# Patient Record
Sex: MALE | NOT HISPANIC OR LATINO | Employment: FULL TIME | URBAN - METROPOLITAN AREA
[De-identification: names, ages, dates, MRNs, and addresses within clinical notes are randomized per-mention and may not be internally consistent; named-entity substitution may affect disease eponyms.]

---

## 2019-02-21 ENCOUNTER — OFFICE VISIT (OUTPATIENT)
Dept: FAMILY MEDICINE | Facility: CLINIC | Age: 54
End: 2019-02-21

## 2019-02-21 DIAGNOSIS — Z02.89 HEALTH EXAMINATION OF DEFINED SUBPOPULATION: Primary | ICD-10-CM

## 2019-02-21 LAB
BILIRUB UR QL: NORMAL
CLARITY: CLEAR
COLOR UR: YELLOW
GLUCOSE URINE: 1000 MG/DL
HGB UR QL: NORMAL
KETONES UR QL: 5 MG/DL
NITRITE UR QL STRIP: NORMAL
PH UR STRIP: 5.5 PH (ref 5–7)
PROT UR QL: NORMAL MG/DL
SP GR UR STRIP: 1.03 (ref 1–1.03)
SPECIMEN VOL UR: NORMAL ML
UROBILINOGEN UR QL STRIP: 0.2 EU/DL (ref 0.2–1)
WBC #/AREA URNS HPF: NORMAL /[HPF]

## 2019-02-21 PROCEDURE — 81003 URINALYSIS AUTO W/O SCOPE: CPT | Performed by: PHYSICIAN ASSISTANT

## 2019-02-21 PROCEDURE — 99499 UNLISTED E&M SERVICE: CPT | Performed by: PHYSICIAN ASSISTANT

## 2019-02-21 NOTE — PROGRESS NOTES
"Form MCSA-5875                                  [facility] No. [id number]  Expiration Date: 2019  MEDICAL EXAMINATION REPORT FORM  (FOR  MEDICAL CERTIFICATION)    SECTION 1.  Information (to be filled out by )    PERSONAL INFORMATION    Last Name: Jacqueline  First Name: Abdirahman Pearson Initial: CARL  : 1965      Age: 54        Street Address: [address] [place], [place] [address] [place]    City: [place]   State/Province: [place]   Zip Code: [address]  's License Number: [id number]      Issuing State/Province: [place]       Phone: [phone]     Gender: male  E-mail (optional): n/a  CLP/CDL Applicant Del Rosario*: no   ID Verified by**:  uJlia Baird MA   Has your USDOT/FMCSA medical certificate ever been denied or issued for less than 2 years? NO     (*CLP/CDL Applicant/Edl Rosario: See instructions for definitions)  (** ID verified By:Record what type of photo ID was used to verify the identity of the , e.g., CDL,'s license, passport)       HEALTH HISTORY    Have you ever had surgery? If \"yes\", please list and explain below.  [x  ] Yes [  ]  No  [  ] Not Sure    Appendectomy 1975  Biceps Tendon repair times 3  Hernia surgery      Are you currently taking medications (prescription, over-the-counter, herbal remedies, diet supplements)? If \"yes,\" please describe below.   [ x ] Yes [  ]  No  [  ] Not Sure    Metformin- Diabetes   symbalt- pain  Lipitor- cholesterol  Lisinopril- kidneys       HEALTH HISTORY (continued)          Do you have or have you ever had:                                                     Not  Yes    No     Sure                                                                          Not    Yes     No   Sure    1. Head/brain injuries or illness (e.g., concussion)  x     16. Dizziness, headaches, numbness, tingling, or memory loss  x       2. Seizures, epilepsy  x     17. Unexplained weight loss  x       3. Eye problems (except glasses or contacts)  x     18. " "Stroke, mini stroke (TIA), paralysis, or weakness  x       4. Ear and/or hearing problems  x     19. Missing or limited use of arm, hand, finger, leg, foot, toe  x       5. Heart disease, heart attack, bypass, or other heart problems  x     20. Neck or back problems x        6. Pacemaker, stents, implantable devices, or other heart procedures  x     21. Bone, muscle, joint or nerve problems  x       7. High blood preassure  x     22. Blood clots or bleeding problems  x       8. High cholesterol x      23. Cancer  x       9. Chronic (long term) cough, shortness of breath, or other breathing problems  x     24. Chronic (long term) infection or other chronic disease  x       10. Lung disease (e.g., asthma)  x     25. Sleep disorders, pauses in breathing while asleep, daytime sleepiness, loud snoring  x       11. Kidney problems, kidney stones, or pain/problems with urination x      26. Have you ever had a sleep test? (e.g., sleep apnea) x        12. Stomach, liver, or digestive problems  x     27. Have you ever spent the night in the hospital? x        13. Diabetes or blood sugar problems x      28. Have you ever had a broken bone? x              Insulin used  x     29. Have you ever used or do you now use tobacco?  x       14. Anxiety, depression, nervousness, other mental health problems  x     30. Do you currently drink alcohol?   x       15. Fainting or passing out  x     31.  Have you used an illegal substance within the past two years?   x         32. Have you ever failed a drug test or been dependent on an illegal substance?   x         Other health condition(s) not described above: [  ] Yes [ x ]  No  [  ] Not Sure         Did you answer \"yes\" to any of questions 1-32?  If so, please comment further on those health conditions below: [  x] Yes [  ]  No  [  ] Not Sure    8. Controlled with medications  11.Removed kidney stones  13. Type 2 diabetes   20. Pulled muscles  26. 2005 or 2006   27. Appendectomy, biceps " "aris 1997(8  28.2 broken bones in arm             'S SIGNATURE  I certify that the above information is accurate and complete. I understand that inaccurate, false or missing information may invalidate the examination and my Medical Examiner's Certificate, that submission of fraudulent or intentionally false information is a violation of 49CFR 390.35, and that submission of fraudulent or intentionally false information may subject me to civil or ciminal penalties under 49 .37 and 49  Appendices A and B.     's signature:____________________________        Date: 2/21/2019                                         Signature if printed       Section 2. Examination Report (to be filled out by the medical examiner)      HEALTH HISTORY REVIEW  Review and discuss pertinent  answers and any available medical records. 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       Stable DM, lipid and HTN on oral medication last A1C is 8 one month ago   Current medication and conditions will not interfere with driving safety.             TESTING     Pulse rate: 98     Pulse rhythm regular: YES  Height: 5 feet 11 inches  Weight: 254.1 pounds    Blood Pressure  Blood Pressure: 136 Systolic  83 Diastolic  Sitting yes  Second Reading (optional) n/a  Other Testing if indicated    none       Urinalysis  Urinalysis is required. Numerical readings must be recorded.  Urine Specimen Specific Gravity Protein Blood Sugar    1.030 NEGATIVE NEGATIVE NEGATIVE   Protein, blood or sugar in the urine may be an indication for further testing to rule out any underlying medical problem.    Vision  Standard is at least 20/40 acuity (Snellen) in each eye with or without correction. At least 70  field of vision in horizontal meridian measured in each eye. The use of corrective lenses should be noted on the " Medical Examiner's Certificate.    ACUITY UNCORRECTED CORRECTED HORIZONTAL FIELD OF VISION   Right Eye N/A 20/20 Greater than 70 degrees   Left Eye N/A 20/20 Greater than 70 degrees   Both Eyes N/A 20/20      Applicant can recognize and distinguish among traffic control signals and devices showing red, green and gabriella colors? Yes    Monocular vision: No    Referred to ophthalmologist or optometrist?  No    Received documentation from ophthalmologist or optometrist?  No    HEARING   Standard: Must first perceive forced whispered voice at not less than 5 feet OR average hearing loss of less than or equal to 40 dB, in better ear (with or without hearing aid).    Check if hearing aid used for test:  Neither    Whispered voice test:    Record the distance from the individual at which a forced whispered voice can first be heard:        Right Ear: greater than 5 feet                  Left Ear: greater than 5 feet             PHYSICAL EXAMINATION  The presence of a certain condition may not necessarily disqualify a , particularly if the condition is controlled adequately, is not likely to worsen, or is readily amenable to treatment. Even if a condition does not disqualify a , the Medical Examiner may consider deferring the  temporarily. Also, the  should be advised to take the necessary steps to correct the condition as soon as possible, particularly if neglecting the condition, could result in more serious illness that might affect driving.    Check the body systems for abnormalities.  BODY SYSTEM Normal or Abnormal   1. General  Normal   2. Skin Normal   3. Eyes Normal   4. Ears Normal   5. Mouth/throat Normal   6. Cardiovascular Normal   7. Lungs/chest Normal   8. Abdomen Normal   9. Genito-urinary System including hernias Normal   10. Back/Spine Normal   11. Extremities/joints Normal   12. Neurological system including reflexes Normal   13. Gait Normal   14. Vascular system Normal     Discuss any  abnormal answers in detail in the space below and indicate whether it would affect the 's ability to operate a CMV. Enter applicable item number before each comment.     normal exam            Please complete only one of the following (Federal or State) Medical Examiner Determination sections:    MEDICAL EXAMINER DETERMINATION (Federal)  Use this section for examinations performed in accordance with the Federal Motor Carrier Safety Regulations (49 ..49):    Meets standards, but periodic monitoring required:   Annual due to DM and HTN               If the  meets the standards outlined in 49 .41, then complete a Medical Examiner's Certificate as stated in 49 .43(h), as appropriate.     I have performed this evaluation for certification. I have personally reviewed all available records and recorded information pertaining to this evaluation, and attest that to the best of my knowledge, I believe it to be true and correct.    Medical Examiner's Signature: _________________________________________                                                                                   (if printed)    Medical Examiner's Name: Yina Thomas PA-C  Medical Examiner's Address:   [address] [address]  [phone]  Dept: [phone]    Date Certificate Signed: 02/21/2019    Medical Examiner's State License, Certificate, or Registration Number: [id number]    Issuing State:  [place]    Physician Assistant    National Registry Number:  [id number]     Medical Examiner's Certificate Expiration Date: 02/21/2020                          Date submitted to registry: 02/21/2019  Submitted by: Julia Baird CMA

## 2022-05-23 ENCOUNTER — OFFICE VISIT (OUTPATIENT)
Dept: FAMILY MEDICINE | Facility: CLINIC | Age: 57
End: 2022-05-23

## 2022-05-23 DIAGNOSIS — Z02.89 HEALTH EXAMINATION OF DEFINED SUBPOPULATION: Primary | ICD-10-CM

## 2022-05-23 LAB
BILIRUB UR QL: NORMAL
CLARITY: CLEAR
COLOR UR: YELLOW
GLUCOSE URINE: 100 MG/DL
HGB UR QL: NORMAL
KETONES UR QL: NORMAL MG/DL
NITRITE UR QL STRIP: NORMAL
PH UR STRIP: 5.5 PH (ref 5–7)
PROT UR QL: NORMAL MG/DL
SP GR UR STRIP: 1.03 (ref 1–1.03)
SPECIMEN VOL UR: NORMAL ML
UROBILINOGEN UR QL STRIP: 0.2 EU/DL (ref 0.2–1)
WBC #/AREA URNS HPF: NORMAL /[HPF]

## 2022-05-23 PROCEDURE — 99499 UNLISTED E&M SERVICE: CPT | Performed by: PHYSICIAN ASSISTANT

## 2022-05-23 PROCEDURE — 81003 URINALYSIS AUTO W/O SCOPE: CPT | Performed by: PHYSICIAN ASSISTANT

## 2022-05-23 NOTE — PROGRESS NOTES
"Form MCSA-5875                                  [facility] No. [id number]  Expiration Date: 21  MEDICAL EXAMINATION REPORT FORM  (FOR  MEDICAL CERTIFICATION)    SECTION 1.  Information (to be filled out by )    PERSONAL INFORMATION    Last Name: Jacqueline  First Name: Abdirahman Pearson Initial: CARL  : 1965      Age: 56        Street Address: [address]   City: [place]   State/Province: [place]   Zip Code: [address]  's License Number: [id number]      Issuing State/Province: [place]       Phone: [phone]     Gender: male  E-mail (optional):   CLP/CDL Applicant Del Rosario*: no   ID Verified by**:  Anita Blackburn  Has your USDOT/FMCSA medical certificate ever been denied or issued for less than 2 years? Not Sure     (*CLP/CDL Applicant/Del Rosario: See instructions for definitions)  (** ID verified By:Record what type of photo ID was used to verify the identity of the , e.g., CDL,'s license, passport)       HEALTH HISTORY    Have you ever had surgery? If \"yes\", please list and explain below.  [x  ] Yes [  ]  No  [  ] Not Sure    Bicep Tendon Repair x4  Dupuythren Contracture repair x2  Kidney Stone Removal x2  Hernia Repair  Appendectomy  Lithotrypty     Are you currently taking medications (prescription, over-the-counter, herbal remedies, diet supplements)? If \"yes,\" please describe below.   [ x ] Yes [  ]  No  [  ] Not Sure    Metformin  Ozempic  Lipitor      HEALTH HISTORY (continued)          Do you have or have you ever had:                                                     Not  Yes    No     Sure                                                                          Not    Yes     No   Sure    1. Head/brain injuries or illness (e.g., concussion)  x     16. Dizziness, headaches, numbness, tingling, or memory loss  x       2. Seizures, epilepsy  x     17. Unexplained weight loss  x       3. Eye problems (except glasses or contacts)  x     18. " "Stroke, mini stroke (TIA), paralysis, or weakness  x       4. Ear and/or hearing problems  x     19. Missing or limited use of arm, hand, finger, leg, foot, toe  x       5. Heart disease, heart attack, bypass, or other heart problems  x     20. Neck or back problems  x       6. Pacemaker, stents, implantable devices, or other heart procedures  x     21. Bone, muscle, joint or nerve problems  x       7. High blood preassure  x     22. Blood clots or bleeding problems  x       8. High cholesterol x      23. Cancer  x       9. Chronic (long term) cough, shortness of breath, or other breathing problems  x     24. Chronic (long term) infection or other chronic disease  x       10. Lung disease (e.g., asthma)  x     25. Sleep disorders, pauses in breathing while asleep, daytime sleepiness, loud snoring x        11. Kidney problems, kidney stones, or pain/problems with urination x      26. Have you ever had a sleep test? (e.g., sleep apnea) x        12. Stomach, liver, or digestive problems  x     27. Have you ever spent the night in the hospital? x        13. Diabetes or blood sugar problems x      28. Have you ever had a broken bone? x              Insulin used  x     29. Have you ever used or do you now use tobacco?  x       14. Anxiety, depression, nervousness, other mental health problems  x     30. Do you currently drink alcohol?  x        15. Fainting or passing out  x     31.  Have you used an illegal substance within the past two years?   x         32. Have you ever failed a drug test or been dependent on an illegal substance?   x         Other health condition(s) not described above: [  ] Yes [ x ]  No  [  ] Not Sure         Did you answer \"yes\" to any of questions 1-32?  If so, please comment further on those health conditions below: [ x ] Yes [  ]  No  [  ] Not Sure    Sleep Test for Sleep Apnea  DM  High cholesterol            'S SIGNATURE  I certify that the above information is accurate and " "complete. I understand that inaccurate, false or missing information may invalidate the examination and my Medical Examiner's Certificate, that submission of fraudulent or intentionally false information is a violation of 49CFR 390.35, and that submission of fraudulent or intentionally false information may subject me to civil or ciminal penalties under 49 .37 and 49  Appendices A and B.     's signature:____________________________        Date: 5/23/2022                                         Signature if printed       Section 2. Examination Report (to be filled out by the medical examiner)      HEALTH HISTORY REVIEW  Review and discuss pertinent  answers and any available medical records. 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        patient reports DM-stable on oral medications A1C 7.2 on 5/21/22  Hyperlipidemia -treated with Lipitor  [place]- patient uses mouth piece daily- no daytime sleepiness             TESTING     Pulse rate: 100     Pulse rhythm regular: YES  Height: 5 feet 9.5 inches  Weight: 240 pounds    Blood Pressure  Blood Pressure: 131 Systolic  80 Diastolic  Sitting yes  Second Reading (optional) NA  Other Testing if indicated    none       Urinalysis  Urinalysis is required. Numerical readings must be recorded.  Urine Specimen Specific Gravity Protein Blood Sugar    1.025 NEGATIVE NEGATIVE TRACE   Protein, blood or sugar in the urine may be an indication for further testing to rule out any underlying medical problem.    Vision  Standard is at least 20/40 acuity (Snellen) in each eye with or without correction. At least 70  field of vision in horizontal meridian measured in each eye. The use of corrective lenses should be noted on the Medical Examiner's Certificate.    ACUITY UNCORRECTED CORRECTED HORIZONTAL FIELD OF VISION   Right Eye N/A 20/25 Greater " than 70 degrees   Left Eye N/A 20/25 Greater than 70 degrees   Both Eyes N/A 20/20      Applicant can recognize and distinguish among traffic control signals and devices showing red, green and gabriella colors? [ x ] Yes [  ]  No    Monocular vision: No    Referred to ophthalmologist or optometrist?  No    Received documentation from ophthalmologist or optometrist?  No    HEARING   Standard: Must first perceive forced whispered voice at not less than 5 feet OR average hearing loss of less than or equal to 40 dB, in better ear (with or without hearing aid).    Check if hearing aid used for test:  [  ] Right Ear [  ]  Left Ear [ x ] Neither    Whispered voice test:    Record the distance from the individual at which a forced whispered voice can first be heard:        Right Ear: greater than 5 feet                  Left Ear: greater than 5 feet             PHYSICAL EXAMINATION  The presence of a certain condition may not necessarily disqualify a , particularly if the condition is controlled adequately, is not likely to worsen, or is readily amenable to treatment. Even if a condition does not disqualify a , the Medical Examiner may consider deferring the  temporarily. Also, the  should be advised to take the necessary steps to correct the condition as soon as possible, particularly if neglecting the condition, could result in more serious illness that might affect driving.    Check the body systems for abnormalities.  BODY SYSTEM Normal or Abnormal   1. General  Abnormal   2. Skin Normal   3. Eyes Normal   4. Ears Normal   5. Mouth/throat Normal   6. Cardiovascular Normal   7. Lungs/chest Normal   8. Abdomen Normal   9. Genito-urinary System including hernias Normal   10. Back/Spine Normal   11. Extremities/joints Normal   12. Neurological system including reflexes Normal   13. Gait Normal   14. Vascular system Normal     Discuss any abnormal answers in detail in the space below and indicate whether  it would affect the 's ability to operate a CMV. Enter applicable item number before each comment.     Obesity-patient treats associated health problems. Will not interfere with driving safety             Please complete only one of the following (Federal or State) Medical Examiner Determination sections:      MEDICAL EXAMINER DETERMINATION (Federal)  Use this section for examinations performed in accordance with the Federal Motor Carrier Safety Regulations (49 ..49):    [  ] Does not meet standards (specify reason) ________________________________________________________________________________  [  ] Meets standards in 49 .41; qualifies for 2-year certificate  [x  ] Meets standards, but periodic monitoring required (specify reason) __OSA, DM II _________________________________________________________         qualified for:   [  ] 3 months               [  ] 6 months               [ x ] 1 year            [  ] other (specify): ________________________________  [  ]  Wearing corrective lenses        [  ] Wearing hearing aid        [  ] Accompanied by a waiver/exception(specify type): ____________________  [  ] Accompanied by a Skill Performance Evaluation (SPE) Certificate    [  ]  Qualified by operation of 49 .64 (Federal)  [  ] Driving within an exempt intracity zone (see 49 .62) (Federal)  [  ] Determination pending (specify reason) __________________________________________________________________________________        [  ] Return to medical exam office for follow-up on (must be 45 days or less _______________________________        [  ] Medical Examination Report amended (specify reason) ______________________________________________                    (if amended) Medical Examiner's Signature _____________________________________________________ Date: _______________  [  ] Incomplete examination (specify reason):  _________________________________________________________________________________            If the  meets the standards outlined in 49 .41, then complete a Medical Examiner's Certificate as stated in 49 .43(h), as appropriate.     I have performed this evaluation for certification. I have personally reviewed all available records and recorded information pertaining to this evaluation, and attest that to the best of my knowledge, I believe it to be true and correct.    Medical Examiner's Signature: _________________________________________                                                                                   (if printed)    Medical Examiner's Name: Snehal Romo PA-C  Medical Examiner's Address:   [address] [address]  [phone]  Dept: [phone]    Date Certificate Signed: 05/23/2022    Medical Examiner's State License, Certificate, or Registration Number: [id number]    Issuing State:  [place]    [  ] M.D.   [  ] D.O.   [x  ] Physician Assistant   [  ] Chiropractor   [  ] Advanced Practice Nurse  [  ] Other Practitioner (specify) __________________________________________________    National Registry Number:  [id number]                Medical Examiner's Certificate Expiration Date: 05/23/2023                 Date submitted to registry: 05/23/2022  Submitted by:  Tami Montes MA

## 2023-05-08 ENCOUNTER — OFFICE VISIT (OUTPATIENT)
Dept: FAMILY MEDICINE | Facility: CLINIC | Age: 58
End: 2023-05-08

## 2023-05-08 DIAGNOSIS — Z02.89 HEALTH EXAMINATION OF DEFINED SUBPOPULATION: Primary | ICD-10-CM

## 2023-05-08 LAB
BILIRUB UR QL: NORMAL
CLARITY: CLEAR
COLOR UR: YELLOW
GLUCOSE URINE: 250 MG/DL
HGB UR QL: NORMAL
KETONES UR QL: NORMAL MG/DL
NITRITE UR QL STRIP: NORMAL
PH UR STRIP: 6 PH (ref 5–7)
PROT UR QL: NORMAL MG/DL
SP GR UR STRIP: 1.03 (ref 1–1.03)
SPECIMEN VOL UR: NORMAL ML
UROBILINOGEN UR QL STRIP: 0.2 EU/DL (ref 0.2–1)
WBC #/AREA URNS HPF: NORMAL /[HPF]

## 2023-05-08 PROCEDURE — 81003 URINALYSIS AUTO W/O SCOPE: CPT | Performed by: PHYSICIAN ASSISTANT

## 2023-05-08 PROCEDURE — 99499 UNLISTED E&M SERVICE: CPT | Performed by: PHYSICIAN ASSISTANT

## 2023-05-08 NOTE — PROGRESS NOTES
Form MCSA-5875                                  [id number]  Expiration Date: 3/31/2025  MEDICAL EXAMINATION REPORT FORM  (FOR  MEDICAL CERTIFICATION)    SECTION 1.  Information (to be filled out by )    PERSONAL INFORMATION    Last Name: Jacqueline  First Name: Abdirahman Pearson Initial: CARL  : 1965      Age: 57        Street Address: [facility] [address]   City: [place]   State/Province: [place]   Zip Code: [address]  's License Number: [id number]      Issuing State/Province: [place]       Phone: [phone]       E-mail (optional): STALIN  CLP/CDL Applicant Del Rosario: no   ID Verified by:  TANIKA Howard  Has your [employer]/F[employer] medical certificate ever been denied or issued for less than 2 years? NO       TESTING       Blood Pressure  Blood Pressure: 125 Systolic  83 Diastolic  Sitting YES  Second Reading (optional)   Other Testing if indicated           Urinalysis  Urinalysis is required. Numerical readings must be recorded.  Urine Specimen Specific Gravity Protein Blood Sugar    1.030 NEGATIVE NEGATIVE NEGATIVE   Protein, blood or sugar in the urine may be an indication for further testing to rule out any underlying medical problem.           PHYSICAL EXAMINATION  The presence of a certain condition may not necessarily disqualify a , particularly if the condition is controlled adequately, is not likely to worsen, or is readily amenable to treatment. Even if a condition does not disqualify a , the Medical Examiner may consider deferring the  temporarily. Also, the  should be advised to take the necessary steps to correct the condition as soon as possible, particularly if neglecting the condition, could result in more serious illness that might affect driving.      Discuss any abnormal answers in detail in the space below and indicate whether it would affect the 's ability to operate a CMV. Enter applicable item number before each comment.    SEE SCANNED  FORMS            Please complete only one of the following (Federal or State) Medical Examiner Determination sections:      MEDICAL EXAMINER DETERMINATION (Federal)  Use this section for examinations performed in accordance with the Federal Motor Carrier Safety Regulations (49 ..49):    [  ] Does not meet standards (specify reason) ________________________________________________________________________________  [  ] Meets standards in 49 .41; qualifies for 2-year certificate  [ x ] Meets standards, but periodic monitoring required (specify reason) ___________________________________________________________         qualified for:   [  ] 3 months               [  ] 6 months               [ x ] 1 year            [  ] other (specify): ________________________________  [ x ]  Wearing corrective lenses        [  ] Wearing hearing aid        [  ] Accompanied by a waiver/exception(specify type): ____________________  [  ] Accompanied by a Skill Performance Evaluation (SPE) Certificate    [  ]  Qualified by operation of 49 .64 (Federal)  [  ] Driving within an exempt intracity zone (see 49 .62) (Federal)  [  ] Determination pending (specify reason) __________________________________________________________________________________        [  ] Return to medical exam office for follow-up on (must be 45 days or less _______________________________        [  ] Medical Examination Report amended (specify reason) ______________________________________________                    (if amended) Medical Examiner's Signature _____________________________________________________ Date: _______________  [  ] Incomplete examination (specify reason): _________________________________________________________________________________            If the  meets the standards outlined in 49 .41, then complete a Medical Examiner's Certificate as stated in 49 .43(h), as appropriate.     I  have performed this evaluation for certification. I have personally reviewed all available records and recorded information pertaining to this evaluation, and attest that to the best of my knowledge, I believe it to be true and correct.    Medical Examiner's Signature: _________________________________________                                                                                   (if printed)    Medical Examiner's Name: Snehal Romo PA-C  Medical Examiner's Address:   [address] [address]  [phone]  Dept: [phone]    Date Certificate Signed: 05/08/2023    Medical Examiner's State License, Certificate, or Registration Number: [id number]    Issuing State:  [place]    [  ] M.D.   [  ] D.O.   [ x ] Physician Assistant   [  ] Chiropractor   [  ] Advanced Practice Nurse  [  ] Other Practitioner (specify) __________________________________________________    National Registry Number:  [id number]       Medical Examiner's Certificate Expiration Date: 05/08/24                     Date submitted to registry: 05/08/23  Submitted by: teresa cortez

## 2024-10-01 ENCOUNTER — OFFICE VISIT (OUTPATIENT)
Dept: FAMILY MEDICINE | Facility: CLINIC | Age: 59
End: 2024-10-01

## 2024-10-01 DIAGNOSIS — Z02.89 ENCOUNTER FOR EXAMINATION REQUIRED BY DEPARTMENT OF TRANSPORTATION (DOT): Primary | ICD-10-CM

## 2024-10-01 LAB
ALBUMIN UR-MCNC: NEGATIVE MG/DL
APPEARANCE UR: CLEAR
BILIRUB UR QL STRIP: NEGATIVE
COLOR UR AUTO: YELLOW
GLUCOSE UR STRIP-MCNC: NEGATIVE MG/DL
HGB UR QL STRIP: NEGATIVE
KETONES UR STRIP-MCNC: NEGATIVE MG/DL
LEUKOCYTE ESTERASE UR QL STRIP: NEGATIVE
NITRATE UR QL: NEGATIVE
PH UR STRIP: 5.5 [PH] (ref 5–7)
SP GR UR STRIP: >=1.03 (ref 1–1.03)
UROBILINOGEN UR STRIP-ACNC: 0.2 E.U./DL

## 2024-10-01 PROCEDURE — 99499 UNLISTED E&M SERVICE: CPT | Performed by: NURSE PRACTITIONER

## 2024-10-01 PROCEDURE — 81003 URINALYSIS AUTO W/O SCOPE: CPT | Performed by: NURSE PRACTITIONER

## 2024-10-01 NOTE — PROGRESS NOTES
Form [id number]                                  [facility] [id number]  Expiration Date: 3/31/2025  MEDICAL EXAMINATION REPORT FORM  (FOR  MEDICAL CERTIFICATION)    SECTION 1.  Information (to be filled out by )    PERSONAL INFORMATION    Last Name: MADELIN  First Name: XENIA Pearson Initial: CARL  : 1965      Age: 58        Street Address: [facility] [address]    City: [facility]   State/Province: [place]   Zip Code: [address]  's License Number: [id number]      Issuing State/Province: [place]       Phone: [phone]       E-mail (optional):   TwtBks/CDL Applicant Del Rosario*: no   ID Verified by**:  RMB/MA  Has your [employer]OT/FMCSA medical certificate ever been denied or issued for less than 2 years? NO       TESTING       Blood Pressure  Blood Pressure: 128 Systolic  78 Diastolic  Sitting   Second Reading (optional)   Other Testing if indicated    *       Urinalysis  Urinalysis is required. Numerical readings must be recorded.  Urine Specimen Specific Gravity Protein Blood Sugar    >1.030 NEGATIVE NEGATIVE NEGATIVE   Protein, blood or sugar in the urine may be an indication for further testing to rule out any underlying medical problem.           PHYSICAL EXAMINATION  The presence of a certain condition may not necessarily disqualify a , particularly if the condition is controlled adequately, is not likely to worsen, or is readily amenable to treatment. Even if a condition does not disqualify a , the Medical Examiner may consider deferring the  temporarily. Also, the  should be advised to take the necessary steps to correct the condition as soon as possible, particularly if neglecting the condition, could result in more serious illness that might affect driving.      Discuss any abnormal answers in detail in the space below and indicate whether it would affect the 's ability to operate a CMV. Enter applicable item number before each comment.    SEE SCANNED FORMS             Please complete only one of the following (Federal or State) Medical Examiner Determination sections:      MEDICAL EXAMINER DETERMINATION (Federal)  Use this section for examinations performed in accordance with the Federal Motor Carrier Safety Regulations (49 ..49):    [  ] Does not meet standards (specify reason) ________________________________________________________________________________  [  ] Meets standards in 49 .41; qualifies for 2-year certificate  [ x ] Meets standards, but periodic monitoring required (specify reason) ________________________HTN/OSA__________________________         qualified for:   [  ] 3 months               [  ] 6 months               [ x ] 1 year            [  ] other (specify): ________________________________  [  ]  Wearing corrective lenses        [  ] Wearing hearing aid        [  ] Accompanied by a waiver/exception(specify type): ____________________  [  ] Accompanied by a Skill Performance Evaluation (SPE) Certificate    [  ]  Qualified by operation of 49 .64 (Federal)  [  ] Driving within an exempt intracity zone (see 49 .62) (Federal)  [  ] Determination pending (specify reason) __________________________________________________________________________________        [  ] Return to medical exam office for follow-up on (must be 45 days or less _______________________________        [  ] Medical Examination Report amended (specify reason) ______________________________________________                    (if amended) Medical Examiner's Signature _____________________________________________________ Date: _______________  [  ] Incomplete examination (specify reason): _________________________________________________________________________________            If the  meets the standards outlined in 49 .41, then complete a Medical Examiner's Certificate as stated in 49 .43(h), as appropriate.     I have  performed this evaluation for certification. I have personally reviewed all available records and recorded information pertaining to this evaluation, and attest that to the best of my knowledge, I believe it to be true and correct.    Medical Examiner's Signature: _________________________________________                                                                                   (if printed)    Medical Examiner's Name: Jenn Stock NP  Medical Examiner's Address:   [address] [address]  [phone]  Dept: [phone]    Date Certificate Signed: 10/1/24    Medical Examiner's State License, Certificate, or Registration Number: [id number]    Issuing State:  [place]    [  ] M.D.   [  ] D.O.   [  ] Physician Assistant   [  ] Chiropractor   [ x ] Advanced Practice Nurse  [  ] Other Practitioner (specify) __________________________________________________    National Registry Number:  [id number]                Medical Examiner's Certificate Expiration Date: 10/1/2025                           If the  meets the standards outlined in 49 .41, with applicable State variances, then complete a Medical Examiner's Certificate, as appropriate.     I have performed this evaluation for certification. I have personally reviewed all available records and recorded information pertaining to this evaluation, and attest that to the best of my knowledge, I believe it to be true and correct.    Medical Examiner's Signature: _________________________________________                                                                                   (if printed)    Medical Examiner's Name: SHANIQUE Segundo-BC  Medical Examiner's Address:   [address] [address]  [phone]    Date Certificate Signed: 10/1/24    Medical Examiner's State License, Certificate, or Registration Number:  [id number]   Issuing State:   [place]    [  ] M.D.   [  ] D.OMita   [  ] Physician Assistant   [  ] Chiropractor   [ x ] Advanced Practice Nurse  [  ] Other Practitioner (specify) __________________________________________________    National Registry Number:  [id number]               Medical Examiner's Certificate Expiration Date: 10/1/2025                      Date submitted to registry:  10/1/24  Submitted by:  Colton Valdez MA